# Patient Record
Sex: FEMALE | Race: WHITE
[De-identification: names, ages, dates, MRNs, and addresses within clinical notes are randomized per-mention and may not be internally consistent; named-entity substitution may affect disease eponyms.]

---

## 2017-01-04 ENCOUNTER — HOSPITAL ENCOUNTER (OUTPATIENT)
Age: 72
Discharge: HOME | End: 2017-01-04
Payer: MEDICARE

## 2017-01-04 DIAGNOSIS — Z12.31: Primary | ICD-10-CM

## 2021-01-08 ENCOUNTER — HOSPITAL ENCOUNTER (EMERGENCY)
Dept: HOSPITAL 76 - ED | Age: 76
Discharge: HOME | End: 2021-01-08
Payer: MEDICARE

## 2021-01-08 VITALS — SYSTOLIC BLOOD PRESSURE: 123 MMHG | DIASTOLIC BLOOD PRESSURE: 69 MMHG

## 2021-01-08 DIAGNOSIS — Z93.3: ICD-10-CM

## 2021-01-08 DIAGNOSIS — K59.00: ICD-10-CM

## 2021-01-08 DIAGNOSIS — I10: ICD-10-CM

## 2021-01-08 DIAGNOSIS — E86.0: Primary | ICD-10-CM

## 2021-01-08 DIAGNOSIS — E87.5: ICD-10-CM

## 2021-01-08 DIAGNOSIS — Z46.6: ICD-10-CM

## 2021-01-08 DIAGNOSIS — Z43.6: ICD-10-CM

## 2021-01-08 DIAGNOSIS — N17.9: ICD-10-CM

## 2021-01-08 LAB
ALBUMIN DIAFP-MCNC: 3.1 G/DL (ref 3.2–5.5)
ALBUMIN/GLOB SERPL: 0.7 {RATIO} (ref 1–2.2)
ALP SERPL-CCNC: 81 IU/L (ref 42–121)
ALT SERPL W P-5'-P-CCNC: < 10 IU/L (ref 10–60)
ANION GAP SERPL CALCULATED.4IONS-SCNC: 11 MMOL/L (ref 6–13)
ANION GAP SERPL CALCULATED.4IONS-SCNC: 6 MMOL/L (ref 6–13)
AST SERPL W P-5'-P-CCNC: 10 IU/L (ref 10–42)
BASOPHILS NFR BLD AUTO: 0 10^3/UL (ref 0–0.1)
BASOPHILS NFR BLD AUTO: 0.5 %
BILIRUB BLD-MCNC: 0.4 MG/DL (ref 0.2–1)
BUN SERPL-MCNC: 61 MG/DL (ref 6–20)
BUN SERPL-MCNC: 65 MG/DL (ref 6–20)
CALCIUM UR-MCNC: 7.7 MG/DL (ref 8.5–10.3)
CALCIUM UR-MCNC: 9.1 MG/DL (ref 8.5–10.3)
CHLORIDE SERPL-SCNC: 104 MMOL/L (ref 101–111)
CHLORIDE SERPL-SCNC: 112 MMOL/L (ref 101–111)
CLARITY UR REFRACT.AUTO: (no result)
CO2 SERPL-SCNC: 18 MMOL/L (ref 21–32)
CO2 SERPL-SCNC: 19 MMOL/L (ref 21–32)
CREAT SERPLBLD-SCNC: 2.2 MG/DL (ref 0.4–1)
CREAT SERPLBLD-SCNC: 2.6 MG/DL (ref 0.4–1)
EOSINOPHIL # BLD AUTO: 0 10^3/UL (ref 0–0.7)
EOSINOPHIL NFR BLD AUTO: 0.2 %
ERYTHROCYTE [DISTWIDTH] IN BLOOD BY AUTOMATED COUNT: 17 % (ref 12–15)
GLOBULIN SER-MCNC: 4.7 G/DL (ref 2.1–4.2)
GLUCOSE SERPL-MCNC: 114 MG/DL (ref 70–100)
GLUCOSE SERPL-MCNC: 89 MG/DL (ref 70–100)
GLUCOSE UR QL STRIP.AUTO: NEGATIVE MG/DL
HGB UR QL STRIP: 11.3 G/DL (ref 12–16)
KETONES UR QL STRIP.AUTO: NEGATIVE MG/DL
LIPASE SERPL-CCNC: 24 U/L (ref 22–51)
LYMPHOCYTES # SPEC AUTO: 0.8 10^3/UL (ref 1.5–3.5)
LYMPHOCYTES NFR BLD AUTO: 9.8 %
MCH RBC QN AUTO: 24.4 PG (ref 27–31)
MCHC RBC AUTO-ENTMCNC: 31.1 G/DL (ref 32–36)
MCV RBC AUTO: 78.2 FL (ref 81–99)
MONOCYTES # BLD AUTO: 0.6 10^3/UL (ref 0–1)
MONOCYTES NFR BLD AUTO: 7.1 %
NEUTROPHILS # BLD AUTO: 7 10^3/UL (ref 1.5–6.6)
NEUTROPHILS # SNV AUTO: 8.5 X10^3/UL (ref 4.8–10.8)
NEUTROPHILS NFR BLD AUTO: 82 %
NITRITE UR QL STRIP.AUTO: NEGATIVE
PDW BLD AUTO: 8.3 FL (ref 7.9–10.8)
PH UR STRIP.AUTO: 6 PH (ref 5–7.5)
PLATELET # BLD: 405 10^3/UL (ref 130–450)
PROT SPEC-MCNC: 7.8 G/DL (ref 6.7–8.2)
PROT UR STRIP.AUTO-MCNC: 100 MG/DL
RBC # UR STRIP.AUTO: (no result) /UL
RBC MAR: 4.64 10^6/UL (ref 4.2–5.4)
SODIUM SERPLBLD-SCNC: 134 MMOL/L (ref 135–145)
SODIUM SERPLBLD-SCNC: 136 MMOL/L (ref 135–145)
SP GR UR STRIP.AUTO: 1.02 (ref 1–1.03)
SQUAMOUS URNS QL MICRO: (no result)
UROBILINOGEN UR QL STRIP.AUTO: (no result) E.U./DL
UROBILINOGEN UR STRIP.AUTO-MCNC: NEGATIVE MG/DL
WBC CLUMPS URNS QL MICRO: PRESENT

## 2021-01-08 PROCEDURE — 85025 COMPLETE CBC W/AUTO DIFF WBC: CPT

## 2021-01-08 PROCEDURE — 81003 URINALYSIS AUTO W/O SCOPE: CPT

## 2021-01-08 PROCEDURE — 81001 URINALYSIS AUTO W/SCOPE: CPT

## 2021-01-08 PROCEDURE — 99283 EMERGENCY DEPT VISIT LOW MDM: CPT

## 2021-01-08 PROCEDURE — 80053 COMPREHEN METABOLIC PANEL: CPT

## 2021-01-08 PROCEDURE — 96360 HYDRATION IV INFUSION INIT: CPT

## 2021-01-08 PROCEDURE — 96361 HYDRATE IV INFUSION ADD-ON: CPT

## 2021-01-08 PROCEDURE — 99284 EMERGENCY DEPT VISIT MOD MDM: CPT

## 2021-01-08 PROCEDURE — 87086 URINE CULTURE/COLONY COUNT: CPT

## 2021-01-08 PROCEDURE — 80048 BASIC METABOLIC PNL TOTAL CA: CPT

## 2021-01-08 PROCEDURE — 83690 ASSAY OF LIPASE: CPT

## 2021-01-08 NOTE — ED PHYSICIAN DOCUMENTATION
History of Present Illness





- Stated complaint


Stated Complaint: CONSTIPATED, LEAKING COLOSTOMY BAG





- Chief complaint


Chief Complaint: Abd Pain





- History obtained from


History obtained from: Patient





- History of Present Illness


Timing: Today


Pain level max: 0


Pain level now: 0





- Additonal information


Additional information: 





Patient is a 75-year-old female that presents to the emergency department with 

ongoing constipation.  She states that she has a left-sided colostomy as well as

a left-sided urostomy.  The left side urostomy has not been draining as much.  

She has an appointment at [facility] on Monday to have this replaced.  She 

is concerned because of decreased output in her ostomy bag.  She states she has 

eaten and drank very little recently.  She has not had any vomiting.  Took 

magnesium citrate without relief.  Uses is 4-6 times daily.  No fevers.  No 

chills.  No blood in the stool.  She is still passing gas into the ostomy bag.





Review of Systems


Constitutional: denies: Fever, Chills


Respiratory: denies: Cough


GI: denies: Nausea, Vomiting, Hematemesis, Bloody / black stool


: denies: Dysuria


Skin: denies: Rash


Musculoskeletal: denies: Neck pain, Back pain





PD PAST MEDICAL HISTORY





- Past Medical History


Cardiovascular: Hypertension


Respiratory: Shortness of breath


Endocrine/Autoimmune: None


GI: Colon polyps, Chronic constipation


: Renal insuffiency


HEENT: None


Psych: None


Musculoskeletal: None


Derm: None





- Past Surgical History


Past Surgical History: Yes


General: Bowel surgery


/GYN: Hysterectomy





- Present Medications


Home Medications: 


                                Ambulatory Orders











 Medication  Instructions  Recorded  Confirmed


 


No Known Home Medications  01/08/21 01/08/21














- Allergies


Allergies/Adverse Reactions: 


                                    Allergies











Allergy/AdvReac Type Severity Reaction Status Date / Time


 


codeine [Codeine] Allergy  Hallucinati Verified 01/08/21 17:49





   ons  


 


Penicillins Allergy  ORAL SORES Verified 01/08/21 17:49


 


sulfamethoxazole Allergy  ORAL Verified 01/08/21 17:49





[From Septra]   SWELLING  


 


trimethoprim [From [facility]ra] Allergy  ORAL Verified 01/08/21 17:49





   SWELLING  














- Social History


Does the pt smoke?: No


Smoking Status: Never smoker


Does the pt drink ETOH?: No


Does the pt have substance abuse?: No





- Immunizations


Immunizations are current?: No





PD ED PE NORMAL





- Vitals


Vital signs reviewed: Yes





- General


General: Alert and oriented X 3, Other (thin, frail female)





- HEENT


HEENT: Other (dry lips and tongue)





- Neck


Neck: Supple, no meningeal sign





- Cardiac


Cardiac: RRR





- Respiratory


Respiratory: No respiratory distress, Clear bilaterally





- Abdomen


Abdomen: Soft, Non tender, Non distended, Other (Colostomy in the left side of 

abdomen.)





- Back


Back: No CVA TTP





- Derm


Derm: Warm and dry





- Extremities


Extremities: No edema





- Neuro


Neuro: Alert and oriented X 3





- Psych


Psych: Normal mood, Normal affect





Results





- Vitals


Vitals: 


                               Vital Signs - 24 hr











  01/08/21 01/08/21





  17:40 21:51


 


Temperature 36.4 C L 


 


Heart Rate 74 70


 


Respiratory 18 18





Rate  


 


Blood Pressure 134/77 H 123/69


 


O2 Saturation 94 97








                                     Oxygen











O2 Source                      Room air

















- Labs


Labs: 


                                Laboratory Tests











  01/08/21 01/08/21 01/08/21





  18:19 18:19 18:40


 


WBC  8.5  


 


RBC  4.64  


 


Hgb  11.3 L  


 


Hct  36.3 L  


 


MCV  78.2 L  


 


MCH  24.4 L  


 


MCHC  31.1 L  


 


RDW  17.0 H  


 


Plt Count  405  


 


MPV  8.3  


 


Neut # (Auto)  7.0 H  


 


Lymph # (Auto)  0.8 L  


 


Mono # (Auto)  0.6  


 


Eos # (Auto)  0.0  


 


Baso # (Auto)  0.0  


 


Absolute Nucleated RBC  0.00  


 


Nucleated RBC %  0.0  


 


Sodium   134 L 


 


Potassium   5.3 H 


 


Chloride   104 


 


Carbon Dioxide   19 L 


 


Anion Gap   11.0 


 


BUN   65 H 


 


Creatinine   2.6 H 


 


Estimated GFR (MDRD)   18 L 


 


Glucose   114 H 


 


Calcium   9.1 


 


Total Bilirubin   0.4 


 


AST   10 


 


ALT   < 10 L 


 


Alkaline Phosphatase   81 


 


Total Protein   7.8 


 


Albumin   3.1 L 


 


Globulin   4.7 H 


 


Albumin/Globulin Ratio   0.7 L 


 


Lipase   24 


 


Urine Color    YELLOW


 


Urine Clarity    TURBID


 


Urine pH    6.0


 


Ur Specific Gravity    1.020


 


Urine Protein    100 H


 


Urine Glucose (UA)    NEGATIVE


 


Urine Ketones    NEGATIVE


 


Urine Occult Blood    LARGE H


 


Urine Nitrite    NEGATIVE


 


Urine Bilirubin    NEGATIVE


 


Urine Urobilinogen    0.2 (NORMAL)


 


Ur Leukocyte Esterase    LARGE H


 


Urine RBC    11-25 H


 


Urine WBC    >25 H


 


Urine WBC Clumps    PRESENT


 


Ur Squamous Epith Cells    NONE SEEN


 


Urine Bacteria    Many H


 


Ur Microscopic Review    INDICATED


 


Urine Culture Comments    INDICATED














  01/08/21





  20:58


 


WBC 


 


RBC 


 


Hgb 


 


Hct 


 


MCV 


 


MCH 


 


MCHC 


 


RDW 


 


Plt Count 


 


MPV 


 


Neut # (Auto) 


 


Lymph # (Auto) 


 


Mono # (Auto) 


 


Eos # (Auto) 


 


Baso # (Auto) 


 


Absolute Nucleated RBC 


 


Nucleated RBC % 


 


Sodium  136


 


Potassium  4.6


 


Chloride  112 H


 


Carbon Dioxide  18 L


 


Anion Gap  6.0


 


BUN  61 H


 


Creatinine  2.2 H


 


Estimated GFR (MDRD)  22 L


 


Glucose  89


 


Calcium  7.7 L


 


Total Bilirubin 


 


AST 


 


ALT 


 


Alkaline Phosphatase 


 


Total Protein 


 


Albumin 


 


Globulin 


 


Albumin/Globulin Ratio 


 


Lipase 


 


Urine Color 


 


Urine Clarity 


 


Urine pH 


 


Ur Specific Gravity 


 


Urine Protein 


 


Urine Glucose (UA) 


 


Urine Ketones 


 


Urine Occult Blood 


 


Urine Nitrite 


 


Urine Bilirubin 


 


Urine Urobilinogen 


 


Ur Leukocyte Esterase 


 


Urine RBC 


 


Urine WBC 


 


Urine WBC Clumps 


 


Ur Squamous Epith Cells 


 


Urine Bacteria 


 


Ur Microscopic Review 


 


Urine Culture Comments 














PD MEDICAL DECISION MAKING





- ED course


Complexity details: reviewed results, re-evaluated patient, considered 

differential, d/w patient


ED course: 





The patient's urostomy bag had been clogged by sediment, this was removed in the

urostomy bag is now draining freely.  She was in acute renal failure with 

hyperkalemia.  Given IV fluids creatinine improved, hyperkalemia improved. Jodi

ent requests something for constipation.  Given half of a bottle of magnesium 

citrate.  Patient is well-appearing, nontoxic.  Afebrile.  We will have her 

follow-up with her doctor on Monday as scheduled.  Patient counseled regarding 

signs and symptoms for which I believe and urgent re-evaluation would be 

necessary. Patient with good understanding of and agreement to plan and is 

comfortable going home at this time





This document was made in part using voice recognition software. While efforts 

are made to proofread this document, sound alike and grammatical errors may 

occur.





Departure





- Departure


Disposition: 01 Home, Self Care


Clinical Impression: 


 Dehydration, Renal insufficiency





Constipation


Qualifiers:


 Constipation type: unspecified constipation type Qualified Code(s): K59.00 - 

Constipation, unspecified





Condition: Good


Instructions:  ED Constipation


Follow-Up: 


Jose Antonio Lowe MD [Primary Care Provider] - 


Comments: 


Make sure you are drinking plenty of water at home.  You need to have your 

kidney function rechecked next week with your doctor, preferably on Monday or 

Tuesday.  Your urostomy is draining well now.  Your creatinine decreased to 2.2 

from 2.6 and should keep improving. Return if you worsen.


Discharge Date/Time: 01/08/21 21:51

## 2021-01-13 NOTE — EXTERNAL MEDICAL SUMMARY RPT
Continuity of Care Document

                           Created on:2021



Patient:SRUTHI NAVA

Sex:Female

:1945

External Reference #:[id number]





Demographics







                          Phone                     Unavailable

 

                          Preferred Language        Unknown

 

                          Marital Status            Unknown

 

                          Rastafari Affiliation     Unknown

 

                          Race                      Unknown

 

                          Ethnic Group              Unknown









Author







                          Organization              [facility]

 

                          Address                    [place] [id number]

 

                          Phone                     [phone]









Support







                Name            Relationship    Address         Phone

 

                RETI            Unavailable     Unavailable     Unavailable









Care Team Providers







                    Name                Role                Phone

 

                    Lowe              Unavailable         Unavailable









Allergies







                     date                description         facility

 

                                         [facility]       [facility]

 

                                         PENICILLIN V POTASSIUM  [facility]

[facility]

 

                                         NO KNOWN ENVIRONMENTAL ALLERGIES  [facility]

 

                                         No Known Drug Allergies  [facility]

 

                                         NO KNOWN ENVIRONMENTAL ALLERGIES  [facility]

 

                                         NO KNOWN ALLERGIES  [facility]

[facility]

 

                                         [facility]ins         [facility]

 

                                         codeine             [facility]

[facility]

 

                                         sulfamethoxazole    [facility]

al [facility]

 

                                         trimethoprim        [facility]

al [facility]







Results





Social History







                     date                description         facility

 

                     [id number]

## 2021-02-03 ENCOUNTER — HOSPITAL ENCOUNTER (OUTPATIENT)
Dept: HOSPITAL 76 - EMS | Age: 76
End: 2021-02-03
Payer: MEDICARE